# Patient Record
Sex: FEMALE | Race: WHITE
[De-identification: names, ages, dates, MRNs, and addresses within clinical notes are randomized per-mention and may not be internally consistent; named-entity substitution may affect disease eponyms.]

---

## 2019-07-08 ENCOUNTER — HOSPITAL ENCOUNTER (OUTPATIENT)
Dept: HOSPITAL 10 - SDS | Age: 59
Setting detail: OBSERVATION
LOS: 1 days | Discharge: HOME | End: 2019-07-09
Attending: INTERNAL MEDICINE | Admitting: INTERNAL MEDICINE
Payer: COMMERCIAL

## 2019-07-08 ENCOUNTER — HOSPITAL ENCOUNTER (OUTPATIENT)
Dept: HOSPITAL 91 - SDS | Age: 59
Setting detail: OBSERVATION
LOS: 1 days | Discharge: HOME | End: 2019-07-09
Payer: COMMERCIAL

## 2019-07-08 VITALS — HEART RATE: 64 BPM | DIASTOLIC BLOOD PRESSURE: 73 MMHG | RESPIRATION RATE: 10 BRPM | SYSTOLIC BLOOD PRESSURE: 116 MMHG

## 2019-07-08 VITALS — RESPIRATION RATE: 16 BRPM | HEART RATE: 71 BPM | DIASTOLIC BLOOD PRESSURE: 73 MMHG | SYSTOLIC BLOOD PRESSURE: 120 MMHG

## 2019-07-08 VITALS
BODY MASS INDEX: 29.93 KG/M2 | WEIGHT: 158.51 LBS | HEIGHT: 61 IN | WEIGHT: 158.51 LBS | HEIGHT: 61 IN | BODY MASS INDEX: 29.93 KG/M2

## 2019-07-08 VITALS — SYSTOLIC BLOOD PRESSURE: 143 MMHG | RESPIRATION RATE: 17 BRPM | HEART RATE: 70 BPM | DIASTOLIC BLOOD PRESSURE: 84 MMHG

## 2019-07-08 VITALS — DIASTOLIC BLOOD PRESSURE: 81 MMHG | RESPIRATION RATE: 16 BRPM | SYSTOLIC BLOOD PRESSURE: 139 MMHG | HEART RATE: 66 BPM

## 2019-07-08 VITALS — SYSTOLIC BLOOD PRESSURE: 149 MMHG | HEART RATE: 68 BPM | RESPIRATION RATE: 18 BRPM | DIASTOLIC BLOOD PRESSURE: 86 MMHG

## 2019-07-08 VITALS — SYSTOLIC BLOOD PRESSURE: 114 MMHG | DIASTOLIC BLOOD PRESSURE: 73 MMHG | HEART RATE: 66 BPM | RESPIRATION RATE: 7 BRPM

## 2019-07-08 VITALS — DIASTOLIC BLOOD PRESSURE: 82 MMHG | RESPIRATION RATE: 16 BRPM | SYSTOLIC BLOOD PRESSURE: 139 MMHG | HEART RATE: 81 BPM

## 2019-07-08 VITALS — RESPIRATION RATE: 16 BRPM | HEART RATE: 66 BPM | SYSTOLIC BLOOD PRESSURE: 139 MMHG | DIASTOLIC BLOOD PRESSURE: 90 MMHG

## 2019-07-08 VITALS — RESPIRATION RATE: 10 BRPM | SYSTOLIC BLOOD PRESSURE: 115 MMHG | HEART RATE: 66 BPM | DIASTOLIC BLOOD PRESSURE: 75 MMHG

## 2019-07-08 VITALS — DIASTOLIC BLOOD PRESSURE: 61 MMHG | HEART RATE: 78 BPM | RESPIRATION RATE: 18 BRPM | SYSTOLIC BLOOD PRESSURE: 119 MMHG

## 2019-07-08 VITALS — SYSTOLIC BLOOD PRESSURE: 116 MMHG | RESPIRATION RATE: 9 BRPM | HEART RATE: 66 BPM | DIASTOLIC BLOOD PRESSURE: 74 MMHG

## 2019-07-08 VITALS — SYSTOLIC BLOOD PRESSURE: 145 MMHG | HEART RATE: 66 BPM | DIASTOLIC BLOOD PRESSURE: 86 MMHG | RESPIRATION RATE: 14 BRPM

## 2019-07-08 VITALS — RESPIRATION RATE: 16 BRPM | DIASTOLIC BLOOD PRESSURE: 72 MMHG | HEART RATE: 68 BPM | SYSTOLIC BLOOD PRESSURE: 120 MMHG

## 2019-07-08 VITALS — RESPIRATION RATE: 16 BRPM | SYSTOLIC BLOOD PRESSURE: 120 MMHG | DIASTOLIC BLOOD PRESSURE: 67 MMHG | HEART RATE: 69 BPM

## 2019-07-08 VITALS — RESPIRATION RATE: 16 BRPM | SYSTOLIC BLOOD PRESSURE: 118 MMHG | DIASTOLIC BLOOD PRESSURE: 73 MMHG | HEART RATE: 62 BPM

## 2019-07-08 VITALS — SYSTOLIC BLOOD PRESSURE: 142 MMHG | DIASTOLIC BLOOD PRESSURE: 80 MMHG | HEART RATE: 81 BPM | RESPIRATION RATE: 16 BRPM

## 2019-07-08 VITALS — RESPIRATION RATE: 16 BRPM | SYSTOLIC BLOOD PRESSURE: 119 MMHG | HEART RATE: 68 BPM | DIASTOLIC BLOOD PRESSURE: 72 MMHG

## 2019-07-08 VITALS — HEART RATE: 66 BPM | RESPIRATION RATE: 12 BRPM | DIASTOLIC BLOOD PRESSURE: 74 MMHG | SYSTOLIC BLOOD PRESSURE: 119 MMHG

## 2019-07-08 VITALS — HEART RATE: 76 BPM | SYSTOLIC BLOOD PRESSURE: 137 MMHG | DIASTOLIC BLOOD PRESSURE: 83 MMHG | RESPIRATION RATE: 21 BRPM

## 2019-07-08 VITALS — SYSTOLIC BLOOD PRESSURE: 120 MMHG | RESPIRATION RATE: 16 BRPM | HEART RATE: 66 BPM | DIASTOLIC BLOOD PRESSURE: 76 MMHG

## 2019-07-08 VITALS — SYSTOLIC BLOOD PRESSURE: 117 MMHG | DIASTOLIC BLOOD PRESSURE: 69 MMHG | HEART RATE: 66 BPM | RESPIRATION RATE: 18 BRPM

## 2019-07-08 VITALS — HEART RATE: 66 BPM | SYSTOLIC BLOOD PRESSURE: 155 MMHG | RESPIRATION RATE: 13 BRPM | DIASTOLIC BLOOD PRESSURE: 91 MMHG

## 2019-07-08 VITALS — SYSTOLIC BLOOD PRESSURE: 125 MMHG | DIASTOLIC BLOOD PRESSURE: 72 MMHG | RESPIRATION RATE: 18 BRPM | HEART RATE: 66 BPM

## 2019-07-08 VITALS — DIASTOLIC BLOOD PRESSURE: 76 MMHG | HEART RATE: 64 BPM | SYSTOLIC BLOOD PRESSURE: 117 MMHG | RESPIRATION RATE: 12 BRPM

## 2019-07-08 VITALS — SYSTOLIC BLOOD PRESSURE: 122 MMHG | RESPIRATION RATE: 16 BRPM | HEART RATE: 68 BPM | DIASTOLIC BLOOD PRESSURE: 73 MMHG

## 2019-07-08 VITALS — HEART RATE: 69 BPM | SYSTOLIC BLOOD PRESSURE: 118 MMHG | RESPIRATION RATE: 16 BRPM | DIASTOLIC BLOOD PRESSURE: 73 MMHG

## 2019-07-08 VITALS — HEART RATE: 72 BPM | SYSTOLIC BLOOD PRESSURE: 140 MMHG | RESPIRATION RATE: 19 BRPM | DIASTOLIC BLOOD PRESSURE: 86 MMHG

## 2019-07-08 VITALS — HEART RATE: 66 BPM | DIASTOLIC BLOOD PRESSURE: 89 MMHG | RESPIRATION RATE: 14 BRPM | SYSTOLIC BLOOD PRESSURE: 149 MMHG

## 2019-07-08 VITALS — HEART RATE: 69 BPM | DIASTOLIC BLOOD PRESSURE: 76 MMHG | RESPIRATION RATE: 16 BRPM | SYSTOLIC BLOOD PRESSURE: 119 MMHG

## 2019-07-08 VITALS — RESPIRATION RATE: 16 BRPM | SYSTOLIC BLOOD PRESSURE: 120 MMHG | DIASTOLIC BLOOD PRESSURE: 73 MMHG | HEART RATE: 63 BPM

## 2019-07-08 VITALS — HEART RATE: 66 BPM | SYSTOLIC BLOOD PRESSURE: 121 MMHG | DIASTOLIC BLOOD PRESSURE: 77 MMHG | RESPIRATION RATE: 13 BRPM

## 2019-07-08 VITALS — RESPIRATION RATE: 17 BRPM | HEART RATE: 69 BPM | DIASTOLIC BLOOD PRESSURE: 73 MMHG | SYSTOLIC BLOOD PRESSURE: 118 MMHG

## 2019-07-08 DIAGNOSIS — K80.10: Primary | ICD-10-CM

## 2019-07-08 DIAGNOSIS — E03.9: ICD-10-CM

## 2019-07-08 DIAGNOSIS — E66.9: ICD-10-CM

## 2019-07-08 DIAGNOSIS — E78.5: ICD-10-CM

## 2019-07-08 DIAGNOSIS — I10: ICD-10-CM

## 2019-07-08 LAB
ADD MAN DIFF?: NO
ALANINE AMINOTRANSFERASE: 63 IU/L (ref 13–69)
ALBUMIN/GLOBULIN RATIO: 1.44
ALBUMIN: 4.2 G/DL (ref 3.3–4.9)
ALKALINE PHOSPHATASE: 56 IU/L (ref 42–121)
ANION GAP: 9 (ref 5–13)
ASPARTATE AMINO TRANSFERASE: 43 IU/L (ref 15–46)
BASOPHIL #: 0 10^3/UL (ref 0–0.1)
BASOPHILS %: 0.1 % (ref 0–2)
BILIRUBIN,DIRECT: 0 MG/DL (ref 0–0.2)
BILIRUBIN,TOTAL: 0.4 MG/DL (ref 0.2–1.3)
BLOOD UREA NITROGEN: 16 MG/DL (ref 7–20)
CALCIUM: 8.8 MG/DL (ref 8.4–10.2)
CARBON DIOXIDE: 23 MMOL/L (ref 21–31)
CHLORIDE: 109 MMOL/L (ref 97–110)
CREATININE: 0.47 MG/DL (ref 0.44–1)
EOSINOPHILS #: 0 10^3/UL (ref 0–0.5)
EOSINOPHILS %: 0 % (ref 0–7)
GLOBULIN: 2.9 G/DL (ref 1.3–3.2)
GLUCOSE: 157 MG/DL (ref 70–220)
HEMATOCRIT: 36.5 % (ref 37–47)
HEMOGLOBIN: 12.7 G/DL (ref 12–16)
IMMATURE GRANS #M: 0.04 10^3/UL (ref 0–0.03)
IMMATURE GRANS % (M): 0.4 % (ref 0–0.43)
LYMPHOCYTES #: 0.7 10^3/UL (ref 0.8–2.9)
LYMPHOCYTES %: 7 % (ref 15–51)
MAGNESIUM: 1.7 MG/DL (ref 1.7–2.5)
MEAN CORPUSCULAR HEMOGLOBIN: 30.6 PG (ref 29–33)
MEAN CORPUSCULAR HGB CONC: 34.8 G/DL (ref 32–37)
MEAN CORPUSCULAR VOLUME: 88 FL (ref 82–101)
MEAN PLATELET VOLUME: 11.6 FL (ref 7.4–10.4)
MONOCYTE #: 0.2 10^3/UL (ref 0.3–0.9)
MONOCYTES %: 1.5 % (ref 0–11)
NEUTROPHIL #: 8.8 10^3/UL (ref 1.6–7.5)
NEUTROPHILS %: 91 % (ref 39–77)
NUCLEATED RED BLOOD CELLS #: 0 10^3/UL (ref 0–0)
NUCLEATED RED BLOOD CELLS%: 0 /100WBC (ref 0–0)
PHOSPHORUS: 3.2 MG/DL (ref 2.5–4.9)
PLATELET COUNT: 167 10^3/UL (ref 140–415)
POTASSIUM: 4.1 MMOL/L (ref 3.5–5.1)
RED BLOOD COUNT: 4.15 10^6/UL (ref 4.2–5.4)
RED CELL DISTRIBUTION WIDTH: 12.7 % (ref 11.5–14.5)
SODIUM: 141 MMOL/L (ref 135–144)
TOTAL PROTEIN: 7.1 G/DL (ref 6.1–8.1)
WHITE BLOOD COUNT: 9.7 10^3/UL (ref 4.8–10.8)

## 2019-07-08 PROCEDURE — 88304 TISSUE EXAM BY PATHOLOGIST: CPT

## 2019-07-08 PROCEDURE — 83036 HEMOGLOBIN GLYCOSYLATED A1C: CPT

## 2019-07-08 PROCEDURE — 80061 LIPID PANEL: CPT

## 2019-07-08 PROCEDURE — 84100 ASSAY OF PHOSPHORUS: CPT

## 2019-07-08 PROCEDURE — 80053 COMPREHEN METABOLIC PANEL: CPT

## 2019-07-08 PROCEDURE — 99217: CPT

## 2019-07-08 PROCEDURE — 85025 COMPLETE CBC W/AUTO DIFF WBC: CPT

## 2019-07-08 PROCEDURE — 47562 LAPAROSCOPIC CHOLECYSTECTOMY: CPT

## 2019-07-08 PROCEDURE — 83735 ASSAY OF MAGNESIUM: CPT

## 2019-07-08 PROCEDURE — G0378 HOSPITAL OBSERVATION PER HR: HCPCS

## 2019-07-08 RX ADMIN — HYDROCODONE BITARTRATE AND ACETAMINOPHEN PRN TAB: 5; 325 TABLET ORAL at 23:46

## 2019-07-08 RX ADMIN — HYDROMORPHONE HYDROCHLORIDE 1 MG: 2 INJECTION INTRAMUSCULAR; INTRAVENOUS; SUBCUTANEOUS at 10:59

## 2019-07-08 RX ADMIN — HYDROMORPHONE HYDROCHLORIDE 1 MG: 2 INJECTION INTRAMUSCULAR; INTRAVENOUS; SUBCUTANEOUS at 11:13

## 2019-07-08 RX ADMIN — HYDROCODONE BITARTRATE AND ACETAMINOPHEN 1 TAB: 5; 325 TABLET ORAL at 23:46

## 2019-07-08 RX ADMIN — HYDROCODONE BITARTRATE AND ACETAMINOPHEN PRN TAB: 5; 325 TABLET ORAL at 15:13

## 2019-07-08 RX ADMIN — HYDROMORPHONE HYDROCHLORIDE 1 MG: 2 INJECTION INTRAMUSCULAR; INTRAVENOUS; SUBCUTANEOUS at 11:07

## 2019-07-08 RX ADMIN — Medication 1 MLS/HR: at 20:26

## 2019-07-08 RX ADMIN — DEXTROSE, SODIUM CHLORIDE, AND POTASSIUM CHLORIDE SCH MLS/HR: 5; .45; .15 INJECTION INTRAVENOUS at 12:00

## 2019-07-08 RX ADMIN — HYDROCODONE BITARTRATE AND ACETAMINOPHEN 1 TAB: 5; 325 TABLET ORAL at 15:13

## 2019-07-08 RX ADMIN — DEXTROSE, SODIUM CHLORIDE, AND POTASSIUM CHLORIDE 1 MLS/HR: 5; .45; .15 INJECTION INTRAVENOUS at 12:00

## 2019-07-08 RX ADMIN — HYDROMORPHONE HYDROCHLORIDE PRN MG: 2 INJECTION INTRAMUSCULAR; INTRAVENOUS; SUBCUTANEOUS at 11:07

## 2019-07-08 RX ADMIN — THIAMINE HYDROCHLORIDE 1 MLS/HR: 100 INJECTION, SOLUTION INTRAMUSCULAR; INTRAVENOUS at 08:20

## 2019-07-08 RX ADMIN — MEPERIDINE HYDROCHLORIDE 1 MG: 25 INJECTION, SOLUTION INTRAMUSCULAR; INTRAVENOUS; SUBCUTANEOUS at 10:45

## 2019-07-08 RX ADMIN — ACETAMINOPHEN 1 MG: 500 TABLET, FILM COATED ORAL at 08:19

## 2019-07-08 RX ADMIN — DEXTROSE, SODIUM CHLORIDE, AND POTASSIUM CHLORIDE 1 MLS/HR: 5; .45; .15 INJECTION INTRAVENOUS at 20:19

## 2019-07-08 RX ADMIN — DEXTROSE, SODIUM CHLORIDE, AND POTASSIUM CHLORIDE SCH MLS/HR: 5; .45; .15 INJECTION INTRAVENOUS at 20:19

## 2019-07-08 RX ADMIN — HYDROMORPHONE HYDROCHLORIDE PRN MG: 2 INJECTION INTRAMUSCULAR; INTRAVENOUS; SUBCUTANEOUS at 10:59

## 2019-07-08 NOTE — OPR
DATE OF OPERATION:  07/08/2019

 

 

PREOPERATIVE DIAGNOSIS:  Symptomatic cholelithiasis.

 

POSTOPERATIVE DIAGNOSIS:  Symptomatic cholelithiasis.

 

PROCEDURE:  Laparoscopic cholecystectomy.

 

ANESTHESIA:  General.

 

ANESTHESIOLOGIST:   _____.

 

SURGEON:  Kentrell Bashir MD.

 

ASSISTANT:  Demario Berrios MD.

 

INDICATIONS FOR PROCEDURE:  The patient is a pleasant 59-year-old female, who presented with severe r
ight upper quadrant pain and ultrasound confirmed cholelithiasis.  She was counseled as to the risks 
versus benefits of cholecystectomy.  She consented and was scheduled for surgery.

 

DESCRIPTION OF PROCEDURE:  The patient was brought to the operating theater, placed under general end
otracheal tube anesthesia.  The abdomen was prepped and draped in usual sterile fashion.  Approximate
ly 2 cm incision was made in the midline just above the umbilicus.  Subcutaneous tissue was dissected
 with cautery down to the anterior rectus sheath, 0 Vicryl stay sutures were placed on either side of
 the linea alba.  The linea alba was incised and the abdomen was entered without difficulty.  Vivien 
trocar was then placed in the standard fashion.  The abdomen was insufflated to a pressure of approxi
mately 14 mmHg with carbon dioxide.  The laparoscope was introduced.  Attention was directed to the r
ight upper quadrant where a distended gallbladder was identified.  The 3 accessory ports were then pl
aced under direct vision in standard fashion.  Through the lateral port sites, the gallbladder was gr
asped at the fundus and neck and retracted cephalad and lateral.  The peritoneum overlying the gallbl
adder was then incised both medially and laterally to facilitate mobilization of the triangle of Kem
t.  With meticulous dissection, the cystic artery was isolated, triply clipped and transected.  Subse
quently, cystic artery was isolated.  Two clips were placed across it distally and it was then transe
cted with the endovascular BALA stapler at its junction with the neck of the gallbladder.  The gallbla
dder was then dissected out of the gallbladder fossa using cautery.  Prior to final transection, irri
gation and inspection took place.  Minimal bleeding was controlled with cautery.  The gallbladder was
 then transected.  The laparoscope was moved to the 12-mm subcostal port site.  The gallbladder was r
etrieved from the abdomen using the gallbladder retrieval bag through the umbilical port site.  The H
asson trocar was then placed back into the abdomen.  The abdomen was reinsufflated.  The laparoscope 
was placed back into the umbilical port site.  Final irrigation and inspection took place.  There was
 no evidence of bleeding.  The 3 accessory ports were then removed under direct vision.  There was no
 evidence of bleeding.  Finally, the umbilical port was removed.  The midline umbilical fascia was re
approximated with 0 Prolene sutures in figure-of-eight fashion.  All wounds were then irrigated with 
Betadine and skin incisions were reapproximated with skin staples.  The patient tolerated the procedu
re well.  The estimated blood loss was approximately 20 mL.  There were no complications and the daisy
ent was transported in stable condition to the recovery room.

 

 

Dictated By: KENTRELL SOLIS/HASMUKH

DD:    07/08/2019 10:24:31

DT:    07/08/2019 10:42:15

Conf#: [id number]

DID#:  [id number]

## 2019-07-08 NOTE — SIPON
Date/Time of Note


Date/Time of Note


DATE: 7/8/19 


TIME: 10:28





Operative Report


Preoperative Diagnosis


Symptomatic cholelithiasis


Postoperative Diagnosis


Same


Operation/Procedure Performed


Laparoscopic cholecystectomy


Surgeon


see signature line


First assist


Dr Berrios


Anesthesia:  general


Estimated blood loss:  10 - 50 ml's


Transfusion Required


   none


Specimen


Gallbladder


Grafts/Implants


none


Complications


none











DENNISE BURNS MD                 Jul 8, 2019 10:29

## 2019-07-08 NOTE — PAC
Date/Time of Note


Date/Time of Note


DATE: 7/8/19 


TIME: 10:23





Post-Anesthesia Notes


Post-Anesthesia Note


Last documented vital signs





Vital Signs


  Date     Temp       Pulse  Resp   B/P       Pulse Ox  O2        O2 Flow   FiO2


Time                                (MAP)               Delivery  Rate


   7/8/19  97.4
97.8  81
81  16
16    139/82    98
98%


08:45
101                           (101)
14      face


        8                               2/80  mask @6L





Activity:  WNL


Respiratory function:  WNL


Cardiovascular function:  WNL


Mental status:  Baseline


Pain reasonably controlled:  Yes


Hydration appropriate:  Yes


Nausea/Vomiting absent:  Yes











SUN GAONA                   Jul 8, 2019 10:24

## 2019-07-08 NOTE — PREAC
Date/Time of Note


Date/Time of Note


DATE: 19 


TIME: 08:55





Anesthesia Eval and Record


Evaluation


Time Pre-Procedure Interview


DATE: 19 


TIME: 08:55


Age


59


Sex


female


NPO:  8 hrs


Preoperative diagnosis


symptomatic cholelithiasis


Planned procedure


Laparoscopic cholecystectomy





Past Medical History


Past Medical History:  Includes (hx kidney stones; pt denies htn (was on 


clonidine last taken years ago))





Surgery & Anesthesia Issues


Hx of PONV (hx ), Hx of delayed emergence





Meds


Anticoagulation:  No


Beta Blocker within 24 hr:  No


Reason Beta Blocker not given:  Pt. not on B-Blocker


Reported Medications


Clonidine Hcl* (Clonidine Hcl*) 0.1 Mg Tab, 0.1 MG PO DAILY PRN for HTN, TAB


   19





Current Medications


Sodium Chloride 1,000 ml @  75 mls/hr [id number] IV  Last administered on 19at 


08:20; Admin Dose 75 MLS/HR;  Start 19 at 06:00;  Stop 19 at 19:19


Cefazolin Sodium/ Dextrose 50 ml @  100 mls/hr PREOP IVPB ;  Start 19 at 


06:00;  Stop 19 at 19:00


Meds reviewed:  Yes





Allergies


Coded Allergies:  


     No Known Allergy (Unverified , 19)


Allergies Reviewed:  Yes





Labs/Studies


Labs Reviewed:  Reviewed by anesthesiologist


Pregnancy test:  N/A


Studies:  CXR (nml)





Pre-procedure Exam


Last vitals





Vital Signs


  Date      Temp  Pulse  Resp  B/P (MAP)   Pulse Ox  O2          O2 Flow    FiO2


Time                                                 Delivery    Rate


    19  97.4     81    16      139/82        98


     08:45                          (101)





Airway:  Adequate mouth opening, Adequate thyromental dist


Mallampati:  Mallampati II


Teeth:  Abnormal (dentures removed; )


Lung:  Normal


Heart:  Normal





ASA Physical Status


ASA physical status:  1


Emergency:  None





Planned Anesthetic


General/MAC:  ETT


Nerve block:  TAP (bilateral)





Pre-operative Attestations


Prior to commencing anesthesia and surgery, the patient was re-evaluated, there 


was verification of:


*The patient's identity


*The results of appropriate recent lab work and preoperative vital signs


*The above evaluation not changing prior to induction


*Anesthetic plan, risk benefits, alternative and complications discussed with 


patient/family; questions answered; patient/family understands, accepts and 


wishes to proceed.











SUN GAONA                   2019 08:58

## 2019-07-09 VITALS — RESPIRATION RATE: 19 BRPM | HEART RATE: 68 BPM | SYSTOLIC BLOOD PRESSURE: 121 MMHG | DIASTOLIC BLOOD PRESSURE: 62 MMHG

## 2019-07-09 VITALS — SYSTOLIC BLOOD PRESSURE: 126 MMHG | DIASTOLIC BLOOD PRESSURE: 69 MMHG | HEART RATE: 77 BPM | RESPIRATION RATE: 18 BRPM

## 2019-07-09 LAB
ADD MAN DIFF?: NO
ALANINE AMINOTRANSFERASE: 49 IU/L (ref 13–69)
ALBUMIN/GLOBULIN RATIO: 1.39
ALBUMIN: 3.9 G/DL (ref 3.3–4.9)
ALKALINE PHOSPHATASE: 52 IU/L (ref 42–121)
ANION GAP: 8 (ref 5–13)
ASPARTATE AMINO TRANSFERASE: 32 IU/L (ref 15–46)
BASOPHIL #: 0 10^3/UL (ref 0–0.1)
BASOPHILS %: 0.1 % (ref 0–2)
BILIRUBIN,DIRECT: 0 MG/DL (ref 0–0.2)
BILIRUBIN,TOTAL: 0.6 MG/DL (ref 0.2–1.3)
BLOOD UREA NITROGEN: 8 MG/DL (ref 7–20)
CALCIUM: 9 MG/DL (ref 8.4–10.2)
CARBON DIOXIDE: 23 MMOL/L (ref 21–31)
CHLORIDE: 112 MMOL/L (ref 97–110)
CHOL/HDL RATIO: 4.6 RATIO
CHOLESTEROL: 267 MG/DL (ref 100–200)
CREATININE: 0.49 MG/DL (ref 0.44–1)
EOSINOPHILS #: 0 10^3/UL (ref 0–0.5)
EOSINOPHILS %: 0 % (ref 0–7)
GLOBULIN: 2.8 G/DL (ref 1.3–3.2)
GLUCOSE: 128 MG/DL (ref 70–220)
HDL CHOLESTEROL: 58 MG/DL (ref 35–98)
HEMATOCRIT: 36.1 % (ref 37–47)
HEMOGLOBIN A1C: 5 % (ref 0–5.9)
HEMOGLOBIN: 12.3 G/DL (ref 12–16)
IMMATURE GRANS #M: 0.04 10^3/UL (ref 0–0.03)
IMMATURE GRANS % (M): 0.4 % (ref 0–0.43)
LDL CHOLESTEROL,CALCULATED: 185 MG/DL
LYMPHOCYTES #: 1.3 10^3/UL (ref 0.8–2.9)
LYMPHOCYTES %: 12.2 % (ref 15–51)
MAGNESIUM: 2 MG/DL (ref 1.7–2.5)
MEAN CORPUSCULAR HEMOGLOBIN: 30 PG (ref 29–33)
MEAN CORPUSCULAR HGB CONC: 34.1 G/DL (ref 32–37)
MEAN CORPUSCULAR VOLUME: 88 FL (ref 82–101)
MEAN PLATELET VOLUME: 11.9 FL (ref 7.4–10.4)
MONOCYTE #: 0.8 10^3/UL (ref 0.3–0.9)
MONOCYTES %: 7.3 % (ref 0–11)
NEUTROPHIL #: 8.8 10^3/UL (ref 1.6–7.5)
NEUTROPHILS %: 80 % (ref 39–77)
NUCLEATED RED BLOOD CELLS #: 0 10^3/UL (ref 0–0)
NUCLEATED RED BLOOD CELLS%: 0 /100WBC (ref 0–0)
PHOSPHORUS: 2.7 MG/DL (ref 2.5–4.9)
PLATELET COUNT: 187 10^3/UL (ref 140–415)
POTASSIUM: 4.4 MMOL/L (ref 3.5–5.1)
RED BLOOD COUNT: 4.1 10^6/UL (ref 4.2–5.4)
RED CELL DISTRIBUTION WIDTH: 12.8 % (ref 11.5–14.5)
SODIUM: 143 MMOL/L (ref 135–144)
TOTAL PROTEIN: 6.7 G/DL (ref 6.1–8.1)
TRIGLYCERIDES: 118 MG/DL (ref 0–149)
WHITE BLOOD COUNT: 11 10^3/UL (ref 4.8–10.8)

## 2019-07-09 RX ADMIN — LEVOTHYROXINE SODIUM 1 MCG: 50 TABLET ORAL at 05:30

## 2019-07-09 RX ADMIN — DEXTROSE, SODIUM CHLORIDE, AND POTASSIUM CHLORIDE SCH MLS/HR: 5; .45; .15 INJECTION INTRAVENOUS at 02:29

## 2019-07-09 RX ADMIN — DEXTROSE, SODIUM CHLORIDE, AND POTASSIUM CHLORIDE 1 MLS/HR: 5; .45; .15 INJECTION INTRAVENOUS at 02:29

## 2019-07-09 NOTE — DS
Date/Time of Note


Date/Time of Note


DATE: 7/9/19 


TIME: 09:09





Discharge Summary


Admission/Discharge Info


Admit Date/Time


Jul 8, 2019 at 10:36


Discharge Date/Time





Discharge Diagnosis


1.  Symptomatic cholelithiasis. Status post laparoscopic cholecystectomy on 7/8 /2019.





2.  Essential hypertension.





3.  Hypothyroidism.





4. Dyslipidemia.





5. Obesity. BMI 30 kg/m.


Patient Condition:  Stable


Consults


1. Kentrell Bashir MD, General Surgery.


Procedures


                                OPERATIVE REPORT


DATE OF OPERATION:  07/08/2019


 


 


PREOPERATIVE DIAGNOSIS:  Symptomatic cholelithiasis.


 


POSTOPERATIVE DIAGNOSIS:  Symptomatic cholelithiasis.


 


PROCEDURE:  Laparoscopic cholecystectomy.


 


ANESTHESIA:  General.


 


SURGEON:  Kentrell Bashir MD.


 


ASSISTANT:  Demario Berrios MD.


Hx of Present Illness


This is a 59-year-old female with past medical history of hypertension and 


hypothyroidism who was brought in electively for a laparoscopic cholecystectomy 


because of generalized abdominal pain with the gallbladder ultrasound showing 


cholelithiasis with possible sludge within the gallbladder.  Patient underwent 


laparoscopic cholecystectomy without any immediate complications.  The patient 


is being admitted to inpatient setting for further monitoring.  Hospitalist 


consult was obtained for medical management and following the patient throughout


the hospital.


Hospital Course


Postoperatively, the patient was started on a clear liquid diet and the 


patient's diet was advanced without any significant gastrointestinal symptoms.  


The patient was encouraged on frequent ambulation and frequent use of incentive 


spirometer.  She was provided with adequate pain control.  Patient progressed 


well during the postoperative course and that the patient was cleared to be 


discharged home on 7/9/2019.





The patient's chronic problems include essential hypertension.  She has been off


antihypertensives at home.  She was maintained on PRN antihypertensives for any 


high blood pressure readings.  Nevertheless, the patient's blood pressure 


remained stable.  The patient also has underlying hypothyroidism.  She was 


maintained on Synthroid.  The patient was noticed to have dyslipidemia with 


elevated total cholesterol and suboptimal LDL.  The patient was advised on low-


cholesterol diet.  The patient has good outpatient follow-up and her 


hyperlipidemia can be addressed appropriately as outpatient.  The patient is 


also obese with a BMI of 30 kg/m.  The patient was advised on weight reduction.





The patient had a stable hospital course.





Discharge Instructions


1. Regular, preferably low cholesterol diet as tolerated.


2. Keep incisions clean and dry. May shower. Avoid tub baths and swimming for 2 


weeks. Use mild soap and pat dry the incisions.


3. Take medications as needed for pain.


4. Call the surgeon or go to the nearest ER if you have severe abdominal pain 


despite pain medications.


5. Call the surgeon or go to the nearest ER if you notice any bleeding or 


secretions coming out of the incision sites. Also call the surgeon if you notice


any blood in stool, if you have persistent fevers, or any other unusual signs or


symptoms.


6. Follow-up with the surgeon (Dr. Bashir) in 7-10 days for incision check. 


7. Avoid heavy lifting [more than 10-15 pounds] for 4 weeks.





The patient verbalized understanding of her discharge instructions.





At this time I would like to thank all the consultants for seeing the patient, 


doing the necessary procedures, and providing clinical recommendations.





The patient was seen in collaboration with Dr. Baker.


Home Meds


Active Scripts


Docusate Sodium* (Colace*) 100 Mg Capsule, 100 MG PO BID, #20 CAP


   Prov:BRIANNA OLEARY NP         7/9/19


Tramadol HCl (Tramadol HCl) 50 Mg Tablet, 50 MG PO Q6H PRN for PAIN, #6 TAB


   Prov:BRIANNA OLEARY NP         7/9/19


Levothyroxine Sodium* (Levothyroxine Sodium*) 50 Mcg Tablet, 50 MCG PO DAILY@06,


#30 TAB


   Patient already has supplies at home.


   Prov:BRIANNA OLEARY NP         7/9/19


Discontinued Reported Medications


Clonidine Hcl* (Clonidine Hcl*) 0.1 Mg Tab, 0.1 MG PO DAILY PRN for HTN, TAB


   7/8/19


Follow-up Plan


Kentrell Bashir MD 


Specialty:  General Surgery 


Office Address 


[address] [place] [id number] 


Office Telephone [phone]


Primary Care Provider


Not On Staff Doctor


Time spent on discharge:   > 30 minutes


Pending Labs





Laboratory Tests


Test
                                    7/8/19
15:31               7/9/19
04:52


White Blood Count
             9.7 10^3/ul
(4.8-10.8)    11.0 10^3/ul
(4.8-10.8)


Red Blood Count
             4.15 10^6/ul
(4.20-5.40)   4.10 10^6/ul
(4.20-5.40)


Hemoglobin
                     12.7 g/dl
(12.0-16.0)      12.3 g/dl
(12.0-16.0)


Hematocrit
                        36.5 %
(37.0-47.0)         36.1 %
(37.0-47.0)


Mean Corpuscular Volume
         88.0 fl
(82.0-101.0)       88.0 fl
(82.0-101.0)


Mean Corpuscular                  30.6 pg
(29.0-33.0)        30.0 pg
(29.0-33.0)


Hemoglobin



Mean Corpuscular                34.8 g/dl
(32.0-37.0)      34.1 g/dl
(32.0-37.0)


Hemoglobin
Concent


Red Cell Distribution              12.7 %
(11.5-14.5)         12.8 %
(11.5-14.5)


Width



Platelet Count
                 167 10^3/UL
(140-415)      187 10^3/UL
(140-415)


Mean Platelet Volume
              11.6 fl
(7.4-10.4)         11.9 fl
(7.4-10.4)


Immature Granulocytes %
        0.400 %
(0.001-0.429)      0.400 %
(0.001-0.429)


Neutrophils %
                     91.0 %
(39.0-77.0)         80.0 %
(39.0-77.0)


Lymphocytes %
                      7.0 %
(15.0-51.0)         12.2 %
(15.0-51.0)


Monocytes %
                         1.5 %
(0.0-11.0)           7.3 %
(0.0-11.0)


Eosinophils %
                        0.0 %
(0.0-7.0)            0.0 %
(0.0-7.0)


Basophils %
                          0.1 %
(0.0-2.0)            0.1 %
(0.0-2.0)


Nucleated Red Blood Cells       0.0 /100WBC
(0.0-0.0)      0.0 /100WBC
(0.0-0.0)


%



Immature Granulocytes #
    0.040 10^3/ul
(0.0-0.031)  0.040 10^3/ul
(0.0-0.031)


Neutrophils #
                  8.8 10^3/ul
(1.6-7.5)      8.8 10^3/ul
(1.6-7.5)


Lymphocytes #
                  0.7 10^3/ul
(0.8-2.9)      1.3 10^3/ul
(0.8-2.9)


Monocytes #
                    0.2 10^3/ul
(0.3-0.9)      0.8 10^3/ul
(0.3-0.9)


Eosinophils #
                  0.0 10^3/ul
(0.0-0.5)      0.0 10^3/ul
(0.0-0.5)


Basophils #
                    0.0 10^3/ul
(0.0-0.1)      0.0 10^3/ul
(0.0-0.1)


Nucleated Red Blood Cells       0.0 10^3/ul
(0.0-0.0)      0.0 10^3/ul
(0.0-0.0)


#



Sodium Level
                    141 mmol/L
(135-144)       143 mmol/L
(135-144)


Potassium Level
                 4.1 mmol/L
(3.5-5.1)       4.4 mmol/L
(3.5-5.1)


Chloride Level
                   109 mmol/L
()        112 mmol/L
()


Carbon Dioxide Level
               23 mmol/L
(21-31)          23 mmol/L
(21-31)


Anion Gap                                   9 (5-13)                   8 (5-13)


Blood Urea Nitrogen
                  16 mg/dl
(7-20)           8 mg/dl (7-20) 



Creatinine
                    0.47 mg/dl
(0.44-1.00)     0.49 mg/dl
(0.44-1.00)


Est Glomerular Filtrat      > 60 mL/min
(>60)          > 60 mL/min
(>60)


Rate
mL/min


Glucose Level
                     157 mg/dl
()         128 mg/dl
()


Calcium Level
                   8.8 mg/dl
(8.4-10.2)       9.0 mg/dl
(8.4-10.2)


Phosphorus Level
                 3.2 mg/dl
(2.5-4.9)        2.7 mg/dl
(2.5-4.9)


Magnesium Level
                  1.7 mg/dl
(1.7-2.5)        2.0 mg/dl
(1.7-2.5)


Total Bilirubin
                  0.4 mg/dl
(0.2-1.3)        0.6 mg/dl
(0.2-1.3)


Direct Bilirubin
              0.00 mg/dl
(0.00-0.20)     0.00 mg/dl
(0.00-0.20)


Indirect Bilirubin
                 0.4 mg/dl
(0-1.1)          0.6 mg/dl
(0-1.1)


Aspartate Amino                       43 IU/L
(15-46)            32 IU/L
(15-46)


Transf
(AST/SGOT)


Alanine                               63 IU/L
(13-69)            49 IU/L
(13-69)


Aminotransferase
(ALT/SGPT


)


Alkaline Phosphatase
                56 IU/L
()           52 IU/L
()


Total Protein
                     7.1 g/dl
(6.1-8.1)         6.7 g/dl
(6.1-8.1)


Albumin
                           4.2 g/dl
(3.3-4.9)         3.9 g/dl
(3.3-4.9)


Globulin
                         2.90 g/dl
(1.3-3.2)        2.80 g/dl
(1.3-3.2)


Albumin/Globulin Ratio                          1.44                       1.39


Hemoglobin A1c                                                    5.0 % (0-5.9)


Triglycerides Level
        
                                  118 mg/dl
(0-149)


Cholesterol Level
          
                                267 mg/dl
(100-200)


LDL Cholesterol,                                                      185 mg/dl


Calculated


HDL Cholesterol
            
                                   58 mg/dl
(35-98)


Cholesterol/HDL Ratio                                                 4.6 RATIO














BRIANNA OLEARY NP                Jul 9, 2019 09:11

## 2019-07-09 NOTE — PDOCDIS
Discharge Instructions


CONDITION


                [id number]
Patient Condition:  [id number]
Stable








HOME CARE INSTRUCTIONS:


         [id number]
Diet Instructions:  [id number]
Low Fat /Cholesterol








ACTIVITY:


Daflz0Wq
Activity Restrictions:        Gsdih1d
Slowly Increase Activity


                                         Rest between Activity


                                         Avoid heavy lifting


                                         Avoid Heavy Housework


[id number]
Bathing Restrictions:         Hgclt9u
Tub Bath


Yujme7Rm
Activity Restrictions         Jesid8b
Do not scrub or soak incision


Comment:








FOLLOW UP/APPOINTMENTS


Follow-up Plan


Kentrell Bashir MD 


Specialty:  General Surgery 


Office Address 


[address] [place] [address] 


Office Telephone [phone]





OTHER ORDERS:


Other Orders:


1. Regular, preferably low cholesterol diet as tolerated.


2. Keep incisions clean and dry. May shower. Avoid tub baths and swimming for 2 


weeks. Use mild soap and pat dry the incisions.


3. Take medications as needed for pain.


4. Call the surgeon or go to the nearest ER if you have severe abdominal pain 


despite pain medications.


5. Call the surgeon or go to the nearest ER if you notice any bleeding or 


secretions coming out of the incision sites. Also call the surgeon if you notice


any blood in stool, if you have persistent fevers, or any other unusual signs or


symptoms.


6. Follow-up with the surgeon (Dr. Bashir) in 7-10 days for incision check. 


7. Avoid heavy lifting [more than 10-15 pounds] for 4 weeks.











BRIANNA OLEARY NP                Jul 9, 2019 09:01

## 2021-11-13 NOTE — HP
Date/Time of Note


Date/Time of Note


DATE: 7/8/19 


TIME: 12:04





Assessment/Plan


VTE Prophylaxis


Risk score (from Ns)>0 risk:  4


SCD applied (from Ns):  Yes


Pharmacological prophylaxis:  NA/contraindicated


Pharm contraindication:  low risk/ambulating





Lines/Catheters


IV Catheter Type (from [facility]):  Peripheral IV





Assessment/Plan


Hospital Course


59-year-old female with comorbidities including hypertension and hypothyroidism 


with underlying symptomatic cholelithiasis who was brought in for elective 


laparoscopic cholecystectomy.





1.  Symptomatic cholelithiasis.


Status post laparoscopic cholecystectomy on 7/8/2019.


Postoperative day #0.


Encourage incentive spirometry and frequent ambulation.


Pain management as per surgery.


Initiation and advancement of diet as per surgery.





2.  Essential hypertension.


Continue PRN antihypertensives for any systolic blood pressure readings greater 


than 160 mmHg.





3.  Hypothyroidism.


Resume Synthroid.








Plan: The patient will be admitted to inpatient medical surgical floor. The 


patient will be started on a clear liquid diet. The patient will be started on 


DVT prophylaxis . The patient will remain a full code. Activities will be as 


tolerated.





The rest of the patient's management will be based on the clinical course, 


inputs from consultants, and the results of diagnostic studies.





Based on the patient's clinical presentation, she most probably requires at 


least 1 midnight's stay for further management and evaluation of her clinical 


presentation.





The patient was seen in collaboration with Dr. Baker.





HPI/ROS


Admit Date/Time


Admit Date/Time


Jul 8, 2019 at 10:36





Hx of Present Illness


This is a 59-year-old female with past medical history of hypertension and 


hypothyroidism who was brought in electively for a laparoscopic cholecystectomy 


because of generalized abdominal pain with the gallbladder ultrasound showing 


cholelithiasis with possible sludge within the gallbladder.  Patient underwent 


laparoscopic cholecystectomy without any immediate complications.  The patient 


is being admitted to inpatient setting for further monitoring.  Hospitalist 


consult was obtained for medical management and following the patient throughout


the hospital.





ROS


Constitutional:  no complaints


Eyes:  no complaints


ENT:  no complaints


Respiratory:  no complaints


Cardiovascular:  no complaints


Gastrointestinal:  pain


Genitourinary:  no complaints


Musculoskeletal:  no complaints


Skin:  no complaints


Neurologic:  no complaints


Endocrine:  no complaints


Lymphatic:  no complaints


Psychological:  no complaints


Immunologic:  no complaints





PMH/Family/Social


Past Medical History


Medical History:  hypertension, hypothyroid


Medications





Coded Allergies:  


     No Known Allergy (Unverified , 7/8/19)





Past Surgical History


Past Surgical Hx:  appendectomy, other (Tonsillectomy)





Social History


Lives at home with family.


Alcohol Use:  none


Smoking Status:  Never smoker


Drug Use:  none





Exam/Review of Systems


Vital Signs


Vitals





Vital Signs


  Date      Temp  Pulse  Resp  B/P (MAP)   Pulse Ox  O2          O2 Flow    FiO2


Time                                                 Delivery    Rate


    7/8/19                         119/73        97  Nasal


     11:45                           (88)            Cannula


    7/8/19           64    10


     11:40


    7/8/19                                                             2.0


     10:50


    7/8/19  97.8


     10:18








Intake and Output





7/7/19 7/7/19 7/8/19





1515:00


23:00


07:00





IntakeIntake Total


800 ml





BalanceBalance


800 ml














Exam


Exam


General: Adequately build 59 year year-old female lying in bed in no apparent 


distress.


HEENT: Normocephalic, atraumatic. Eyes: Anicteric sclerae, conjunctivae clear. 


ENT: Nasal septum midline, oral mucosa moist. Neck supple, no JVD noticed.


Respiratory: Bilaterally clear breath sounds. No use of accessory muscles of 


respiration. No adventitious breath sounds.


Cardiovascular: S1, S2 heard. No murmurs or gallops. 


Abdomen: Soft and nondistended. Bandage over laparoscopic incision sites.


Genitourinary: Deferred.


Extremities: No cyanosis, no clubbing, no edema. Peripheral pulses palpable.


Neurologic: Cranial nerves II through XII grossly intact. The patient is awake, 


alert, and oriented.


Skin: Normal skin turgor. No skin rashes.











BRIANNA OLEARY NP                Jul 8, 2019 12:05 Report received assuming care of this patient.